# Patient Record
Sex: FEMALE | Race: WHITE
[De-identification: names, ages, dates, MRNs, and addresses within clinical notes are randomized per-mention and may not be internally consistent; named-entity substitution may affect disease eponyms.]

---

## 2019-12-16 ENCOUNTER — HOSPITAL ENCOUNTER (OUTPATIENT)
Dept: HOSPITAL 47 - RADMAMWWP | Age: 49
Discharge: HOME | End: 2019-12-16
Attending: INTERNAL MEDICINE
Payer: COMMERCIAL

## 2019-12-16 DIAGNOSIS — Z12.31: Primary | ICD-10-CM

## 2019-12-16 PROCEDURE — 77063 BREAST TOMOSYNTHESIS BI: CPT

## 2019-12-16 PROCEDURE — 77067 SCR MAMMO BI INCL CAD: CPT

## 2019-12-16 NOTE — MM
Reason for exam: screening  (asymptomatic).

Baseline mammogram.



History:

Patient is postmenopausal and is nulliparous.



Physical Findings:

Nurse did not find any significant physical abnormalities on exam.



MG 3D Screening Mammo W/Cad

Bilateral CC and MLO view(s) were taken.

The breast tissue is heterogeneously dense. This may lower the sensitivity of 

mammography.  No suspicious abnormality.



These results were verbally communicated with the patient and result sheet given 

to the patient on 12/16/19.





ASSESSMENT: Negative, BI-RAD 1



RECOMMENDATION:

Routine screening mammogram of both breasts in 1 year.

## 2020-01-17 ENCOUNTER — HOSPITAL ENCOUNTER (OUTPATIENT)
Dept: HOSPITAL 47 - ORWHC2ENDO | Age: 50
Discharge: HOME | End: 2020-01-17
Attending: SURGERY
Payer: COMMERCIAL

## 2020-01-17 VITALS — TEMPERATURE: 97.3 F

## 2020-01-17 VITALS — SYSTOLIC BLOOD PRESSURE: 132 MMHG | DIASTOLIC BLOOD PRESSURE: 84 MMHG | HEART RATE: 82 BPM

## 2020-01-17 VITALS — RESPIRATION RATE: 16 BRPM

## 2020-01-17 VITALS — BODY MASS INDEX: 25.4 KG/M2

## 2020-01-17 DIAGNOSIS — F41.9: ICD-10-CM

## 2020-01-17 DIAGNOSIS — Z90.49: ICD-10-CM

## 2020-01-17 DIAGNOSIS — I69.311: ICD-10-CM

## 2020-01-17 DIAGNOSIS — F17.210: ICD-10-CM

## 2020-01-17 DIAGNOSIS — Z88.6: ICD-10-CM

## 2020-01-17 DIAGNOSIS — F32.9: ICD-10-CM

## 2020-01-17 DIAGNOSIS — K64.9: ICD-10-CM

## 2020-01-17 DIAGNOSIS — G89.29: ICD-10-CM

## 2020-01-17 DIAGNOSIS — Z88.0: ICD-10-CM

## 2020-01-17 DIAGNOSIS — E78.5: ICD-10-CM

## 2020-01-17 DIAGNOSIS — R60.0: ICD-10-CM

## 2020-01-17 DIAGNOSIS — Z79.899: ICD-10-CM

## 2020-01-17 DIAGNOSIS — I10: ICD-10-CM

## 2020-01-17 DIAGNOSIS — M54.9: ICD-10-CM

## 2020-01-17 DIAGNOSIS — Z88.1: ICD-10-CM

## 2020-01-17 DIAGNOSIS — Z88.8: ICD-10-CM

## 2020-01-17 DIAGNOSIS — M41.9: ICD-10-CM

## 2020-01-17 DIAGNOSIS — Z79.891: ICD-10-CM

## 2020-01-17 DIAGNOSIS — Z12.11: Primary | ICD-10-CM

## 2020-01-17 PROCEDURE — 45378 DIAGNOSTIC COLONOSCOPY: CPT

## 2020-01-17 NOTE — P.GSHP
History of Present Illness


H&P Date: 01/17/20


Chief Complaint: Colon cancer screening





Patient here today for colonoscopy.  She has not had one previously.  No family 

history of colon cancer.  No bowel related complaints.  In the distant past she 

had rectal bleeding while on blood thinners.





Past Medical History


Past Medical History: CVA/TIA, Hyperlipidemia, Hypertension, Memory Impairment


Additional Past Medical History / Comment(s): chronic back pain, leg edema, 

scoliosis, CVA 2018 SOME MEMORY IMPAIRMENT


History of Any Multi-Drug Resistant Organisms: None Reported


Past Surgical History: Cholecystectomy, Orthopedic Surgery


Additional Past Surgical History / Comment(s): LT knee sx,


Past Anesthesia/Blood Transfusion Reactions: No Reported Reaction


Past Psychological History: Anxiety, Depression


Smoking Status: Current every day smoker


Past Alcohol Use History: Rare


Additional Past Alcohol Use History / Comment(s): SMOKES 1 -2 PPD- SINCE AGE 9


Past Drug Use History: Marijuana


Additional Drug Use History / Comment(s): USES MARIJUANA DAILY-INFORMED TO 

REFRAIN FROM USE FOR AT LEAST 24  HOURS PRIOR TO PROCEDURE





- Past Family History


  ** Mother


Family Medical History: No Reported History





Medications and Allergies


                                Home Medications











 Medication  Instructions  Recorded  Confirmed  Type


 


Butalb/Acetaminophen/Caffeine 1 cap PO Q6H PRN 06/20/17 01/15/20 History





[Esgic -40 Capsule]    


 


LORazepam [Ativan] 0.5 mg PO HS PRN 06/20/17 01/15/20 History


 


Naproxen 500 mg PO BID PRN 06/20/17 01/15/20 History


 


risperiDONE [RisperDAL] 2 mg PO HS 06/20/17 01/15/20 History


 


Atorvastatin [Lipitor] 20 mg PO DAILY 01/15/20 01/15/20 History


 


Cyanocobalamin-FA-Pyridoxine 1 tab PO DAILY 01/15/20 01/15/20 History





[Folbic]    


 


HYDROcodone/APAP 7.5-325MG [Norco 1 tab PO BID 01/15/20 01/15/20 History





7.5-325]    


 


Montelukast [Singulair] 10 mg PO DAILY 01/15/20 01/15/20 History


 


Multivit/Folic Acid/Vit K1 1 each PO DAILY 01/15/20 01/15/20 History





[One-A-Day Women's 50 Plus Tab]    


 


Propranolol HCl [Propranolol HCl 60 mg PO DAILY 01/15/20 01/15/20 History





ER]    


 


Sertraline [Zoloft] 100 mg PO DAILY 01/15/20 01/15/20 History


 


Venlafaxine HCl [Effexor XR] 37.5 mg PO DAILY 01/15/20 01/15/20 History


 


risperiDONE 1 mg PO QAM 01/15/20 01/15/20 History








                                    Allergies











Allergy/AdvReac Type Severity Reaction Status Date / Time


 


aspirin Allergy  Vomiting Verified 01/15/20 10:57


 


clarithromycin [From Biaxin] Allergy  Vomiting Verified 01/15/20 10:57


 


omeprazole Allergy  Nausea Verified 01/15/20 10:57


 


Penicillins Allergy  Dyspnea Verified 01/15/20 10:57














Surgical - Exam


                                   Vital Signs











Temp Pulse Resp BP Pulse Ox


 


 97.3 F L  75   18   122/71   97 


 


 01/17/20 09:00  01/17/20 09:00  01/17/20 09:00  01/17/20 09:00  01/17/20 09:00

















Physical exam:


General: Well-developed, well-nourished


HEENT: Normocephalic, sclerae nonicteric


Abdomen: Nontender, nondistended


Extremities: No edema


Neuro: Alert and oriented





Assessment and Plan


(1) Colon cancer screening


Narrative/Plan: 


Will proceed with colonoscopy


Current Visit: Yes   Status: Acute   Code(s): Z12.11 - ENCOUNTER FOR SCREENING 

FOR MALIGNANT NEOPLASM OF COLON   SNOMED Code(s): 618237184

## 2020-01-17 NOTE — P.PCN
Date of Procedure: 01/17/20


Procedure(s) Performed: 


PREOPERATIVE DIAGNOSIS: Colon cancer screening


POSTOPERATIVE DIAGNOSIS: Small hemorrhoids


PROCEDURE: Colonoscopy 


ANESTHESIA: MAC


SURGEON: Ahmet Hubbard M.D.


SPECIMENS: None


ENDOSCOPIC PROCEDURE:  The patient was placed on the endoscopy table in the left

decubitus position.  The Olympus colonoscope was inserted into the anus and 

passed under direct visualization to the base of the cecum.  The appendiceal 

orifice was visualized.  From that point the scope was slowly withdrawn in

specting all surfaces carefully.  There were no neoplastic inflammatory or 

polypoid lesions throughout the cecum, ascending, transverse, descending, 

sigmoid and rectum.  There was no visible diverticulosis noted.  Digital rectal 

examination was normal.  The patient was taken to the recovery room in stable 

condition per anesthesia guidelines.


RECOMMENDATIONS: Increase fiber.  Follow-up colonoscopy 10 years.

## 2021-04-02 ENCOUNTER — HOSPITAL ENCOUNTER (EMERGENCY)
Dept: HOSPITAL 47 - EC | Age: 51
Discharge: HOME | End: 2021-04-02
Payer: COMMERCIAL

## 2021-04-02 VITALS
SYSTOLIC BLOOD PRESSURE: 142 MMHG | HEART RATE: 53 BPM | DIASTOLIC BLOOD PRESSURE: 71 MMHG | TEMPERATURE: 98 F | RESPIRATION RATE: 16 BRPM

## 2021-04-02 DIAGNOSIS — F41.9: ICD-10-CM

## 2021-04-02 DIAGNOSIS — F32.9: ICD-10-CM

## 2021-04-02 DIAGNOSIS — I10: ICD-10-CM

## 2021-04-02 DIAGNOSIS — U07.1: Primary | ICD-10-CM

## 2021-04-02 DIAGNOSIS — Z88.0: ICD-10-CM

## 2021-04-02 DIAGNOSIS — Z79.899: ICD-10-CM

## 2021-04-02 PROCEDURE — 99283 EMERGENCY DEPT VISIT LOW MDM: CPT

## 2021-04-02 NOTE — ED
Recheck HPI





- General


Chief Complaint: Recheck/Abnormal Lab/Rx


Stated Complaint: +COVID,Sent by pcp for BAM


Time Seen by Provider: 04/02/21 14:51


Source: patient


Limitations: no limitations





- History of Present Illness


Initial Comments: 





Patient is a 50-year-old female presenting to the emergency department after 

being sent in by PCP for possible covid antibody treatment.  Patient tested 

positive for Covid today, along with her , and PCP sent in.  She is 

currently asymptomatic, she'll he got tested secondary to her  being 

exposed to at work.  She denies history of asthma or COPD, she does have MS.  

She has no fevers or chills, no chest pain or short of breath, no cough.  She 

has no further complaints.  Upon arrival to the ER her vitals are stable.





- Related Data


                                Home Medications











 Medication  Instructions  Recorded  Confirmed


 


Butalb/Acetaminophen/Caffeine 1 cap PO Q6H PRN 06/20/17 01/15/20





[Esgic -40 Capsule]   


 


LORazepam [Ativan] 0.5 mg PO HS PRN 06/20/17 01/15/20


 


Naproxen 500 mg PO BID PRN 06/20/17 01/15/20


 


risperiDONE [RisperDAL] 2 mg PO HS 06/20/17 01/15/20


 


Atorvastatin [Lipitor] 20 mg PO DAILY 01/15/20 01/15/20


 


Cyanocobalamin-FA-Pyridoxine 1 tab PO DAILY 01/15/20 01/15/20





[Folbic]   


 


HYDROcodone/APAP 7.5-325MG [Norco 1 tab PO BID 01/15/20 01/15/20





7.5-325]   


 


Montelukast [Singulair] 10 mg PO DAILY 01/15/20 01/15/20


 


Multivit/Folic Acid/Vit K1 1 each PO DAILY 01/15/20 01/15/20





[One-A-Day Women's 50 Plus Tab]   


 


Propranolol HCl [Propranolol HCl 60 mg PO DAILY 01/15/20 01/15/20





ER]   


 


Sertraline [Zoloft] 100 mg PO DAILY 01/15/20 01/15/20


 


Venlafaxine HCl [Effexor XR] 37.5 mg PO DAILY 01/15/20 01/15/20


 


risperiDONE 1 mg PO QAM 01/15/20 01/15/20











                                    Allergies











Allergy/AdvReac Type Severity Reaction Status Date / Time


 


aspirin Allergy  Vomiting Verified 04/02/21 14:16


 


clarithromycin [From Biaxin] Allergy  Vomiting Verified 04/02/21 14:16


 


omeprazole Allergy  Nausea Verified 04/02/21 14:16


 


Penicillins Allergy  Dyspnea Verified 04/02/21 14:16














Review of Systems


ROS Statement: 


Those systems with pertinent positive or pertinent negative responses have been 

documented in the HPI.





ROS Other: All systems not noted in ROS Statement are negative.





Past Medical History


Past Medical History: Hypertension


Additional Past Medical History / Comment(s): chronic back pain, leg edema, 

scoliosis, anxiety depression


History of Any Multi-Drug Resistant Organisms: None Reported


Past Surgical History: Orthopedic Surgery


Additional Past Surgical History / Comment(s): knee sx


Past Anesthesia/Blood Transfusion Reactions: No Reported Reaction


Past Psychological History: Anxiety, Depression


Past Alcohol Use History: None Reported


Past Drug Use History: None Reported





- Past Family History


  ** Mother


Family Medical History: No Reported History





General Exam





- General Exam Comments


Initial Comments: 





GENERAL: 


Patient is well-developed and well-nourished.  Patient is nontoxic and in no 

acute distress.





HEAD: 


Atraumatic, normocephalic.





EYES:


Pupils equal round and reactive to light, extraocular movements intact, sclera 

anicteric, conjunctiva are normal.  Eyelids were unremarkable.





ENT: 


TMs normal, nares patent, oropharynx clear without exudates.  Moist mucous 

membranes.





NECK: 


Normal range of motion, supple without lymphadenopathy or JVD.





LUNGS:


Unlabored respirations.  Breath sounds clear to auscultation bilaterally and 

equal.  No wheezes rales or rhonchi.





HEART:


Regular rate and rhythm without murmurs, rubs or gallops.





ABDOMEN: 


Soft, nontender, normoactive bowel sounds.  No guarding, no rebound.  No masses 

appreciated.





: Deferred 





MUSCULOSKELETAL: 


Normal extremities with adequate strength and normal range of motion, no pitting

or edema.  No clubbing or cyanosis.





NEUROLOGICAL: 


Patient is alert and oriented x 3.  Motor and sensory are also intact.  Cranial 

nerves II through XII grossly intact.  Symmetrical smile.  Normal speech, normal

gait.   





PSYCH:


Normal mood, normal affect.





SKIN:


 Warm, Dry, normal turgor, no rashes or lesions noted.


Limitations: no limitations





Course


                                   Vital Signs











  04/02/21





  14:16


 


Temperature 98 F


 


Pulse Rate 53 L


 


Respiratory 16





Rate 


 


Blood Pressure 142/71


 


O2 Sat by Pulse 100





Oximetry 














Medical Decision Making





- Medical Decision Making





Patient is a 50-year-old female history of MS, presenting after testing positive

for Covid today.  She was tested due to her  been exposed at work.  She 

is asymptomatic, her exam is unremarkable.  She does not qualify for Covid 

antiviral treatment.  She can follow-up with her PCP.  She stable for discharge.

 Case discussed with Dr. Brown.





Disposition


Clinical Impression: 


 COVID-19





Disposition: HOME SELF-CARE


Condition: Stable


Instructions (If sedation given, give patient instructions):  Coronavirus Dis

ease 2019 (COVID-19)


Additional Instructions: 


Please return to the Emergency Department if symptoms worsen or any other 

concerns.


Positive Covid test


May take Tylenol or Motrin for body aches, headaches.


Is patient prescribed a controlled substance at d/c from ED?: No


Referrals: 


Migel Santa MD [Primary Care Provider] - 1-2 days

## 2021-05-25 ENCOUNTER — HOSPITAL ENCOUNTER (OUTPATIENT)
Dept: HOSPITAL 47 - LABWHC1 | Age: 51
Discharge: HOME | End: 2021-05-25
Attending: PHYSICIAN ASSISTANT
Payer: COMMERCIAL

## 2021-05-25 DIAGNOSIS — G35: Primary | ICD-10-CM

## 2021-05-25 LAB
BASOPHILS # BLD AUTO: 0.05 X 10*3/UL (ref 0–0.1)
BASOPHILS NFR BLD AUTO: 0.8 %
EOSINOPHIL # BLD AUTO: 0.24 X 10*3/UL (ref 0.04–0.35)
EOSINOPHIL NFR BLD AUTO: 3.6 %
ERYTHROCYTE [DISTWIDTH] IN BLOOD BY AUTOMATED COUNT: 4.22 X 10*6/UL (ref 4.1–5.2)
ERYTHROCYTE [DISTWIDTH] IN BLOOD: 13 % (ref 11.5–14.5)
HCT VFR BLD AUTO: 38.8 % (ref 37.2–46.3)
HGB BLD-MCNC: 12.6 G/DL (ref 12–15)
LYMPHOCYTES # SPEC AUTO: 2.12 X 10*3/UL (ref 0.9–5)
LYMPHOCYTES NFR SPEC AUTO: 32 %
MCH RBC QN AUTO: 29.9 PG (ref 27–32)
MCHC RBC AUTO-ENTMCNC: 32.5 G/DL (ref 32–37)
MCV RBC AUTO: 91.9 FL (ref 80–97)
MONOCYTES # BLD AUTO: 0.41 X 10*3/UL (ref 0.2–1)
MONOCYTES NFR BLD AUTO: 6.2 %
NEUTROPHILS # BLD AUTO: 3.78 X 10*3/UL (ref 1.8–7.7)
NEUTROPHILS NFR BLD AUTO: 57.1 %
PLATELET # BLD AUTO: 175 X 10*3/UL (ref 140–440)
WBC # BLD AUTO: 6.62 X 10*3/UL (ref 4.5–10)

## 2021-05-25 PROCEDURE — 85025 COMPLETE CBC W/AUTO DIFF WBC: CPT

## 2021-05-25 PROCEDURE — 36415 COLL VENOUS BLD VENIPUNCTURE: CPT

## 2021-05-25 PROCEDURE — 82306 VITAMIN D 25 HYDROXY: CPT

## 2021-05-25 PROCEDURE — 80053 COMPREHEN METABOLIC PANEL: CPT

## 2021-05-26 LAB
ALBUMIN SERPL-MCNC: 4.1 G/DL (ref 3.8–4.9)
ALBUMIN/GLOB SERPL: 2.16 G/DL (ref 1.6–3.17)
ALP SERPL-CCNC: 94 U/L (ref 41–126)
ALT SERPL-CCNC: 19 U/L (ref 8–44)
ANION GAP SERPL CALC-SCNC: 10.2 MMOL/L (ref 4–12)
AST SERPL-CCNC: 25 U/L (ref 13–35)
BUN SERPL-SCNC: 7 MG/DL (ref 9–27)
BUN/CREAT SERPL: 8.75 RATIO (ref 12–20)
CALCIUM SPEC-MCNC: 8.8 MG/DL (ref 8.7–10.3)
CHLORIDE SERPL-SCNC: 107 MMOL/L (ref 96–109)
CO2 SERPL-SCNC: 25.8 MMOL/L (ref 21.6–31.8)
GLOBULIN SER CALC-MCNC: 1.9 G/DL (ref 1.6–3.3)
GLUCOSE SERPL-MCNC: 75 MG/DL (ref 70–110)
POTASSIUM SERPL-SCNC: 4.8 MMOL/L (ref 3.5–5.5)
PROT SERPL-MCNC: 6 G/DL (ref 6.2–8.2)
SODIUM SERPL-SCNC: 143 MMOL/L (ref 135–145)

## 2021-10-19 ENCOUNTER — HOSPITAL ENCOUNTER (OUTPATIENT)
Dept: HOSPITAL 47 - RADMAMWWP | Age: 51
Discharge: HOME | End: 2021-10-19
Attending: INTERNAL MEDICINE
Payer: COMMERCIAL

## 2021-10-19 DIAGNOSIS — Z12.31: Primary | ICD-10-CM

## 2021-10-19 PROCEDURE — 77067 SCR MAMMO BI INCL CAD: CPT

## 2021-10-19 PROCEDURE — 77063 BREAST TOMOSYNTHESIS BI: CPT

## 2021-10-20 NOTE — MM
Reason for exam: screening  (asymptomatic).

Last mammogram was performed 1 year and 10 months ago.



History:

Patient is postmenopausal and is nulliparous.



Physical Findings:

A clinical breast exam by your physician is recommended on an annual basis and 

results should be correlated with mammographic findings.



MG 3D Screening Mammo W/Cad

Bilateral CC and MLO view(s) were taken.

Prior study comparison: December 16, 2019, bilateral MG 3d screening mammo w/cad.

The breast tissue is heterogeneously dense. This may lower the sensitivity of 

mammography.  There is no discrete abnormality.  No significant changes when 

compared with prior studies.





ASSESSMENT: Negative, BI-RAD 1



RECOMMENDATION:

Routine screening mammogram of both breasts in 1 year.

## 2023-03-21 ENCOUNTER — HOSPITAL ENCOUNTER (EMERGENCY)
Dept: HOSPITAL 47 - EC | Age: 53
LOS: 1 days | Discharge: HOME | End: 2023-03-22
Payer: COMMERCIAL

## 2023-03-21 VITALS — SYSTOLIC BLOOD PRESSURE: 94 MMHG | DIASTOLIC BLOOD PRESSURE: 73 MMHG | TEMPERATURE: 98.4 F | RESPIRATION RATE: 18 BRPM

## 2023-03-21 DIAGNOSIS — Z88.1: ICD-10-CM

## 2023-03-21 DIAGNOSIS — Z79.899: ICD-10-CM

## 2023-03-21 DIAGNOSIS — F32.A: ICD-10-CM

## 2023-03-21 DIAGNOSIS — I10: ICD-10-CM

## 2023-03-21 DIAGNOSIS — Z88.8: ICD-10-CM

## 2023-03-21 DIAGNOSIS — F41.9: ICD-10-CM

## 2023-03-21 DIAGNOSIS — Z88.0: ICD-10-CM

## 2023-03-21 DIAGNOSIS — Z88.6: ICD-10-CM

## 2023-03-21 DIAGNOSIS — R55: Primary | ICD-10-CM

## 2023-03-21 LAB
ALBUMIN SERPL-MCNC: 4.1 G/DL (ref 3.5–5)
ALP SERPL-CCNC: 79 U/L (ref 38–126)
ALT SERPL-CCNC: 26 U/L (ref 4–34)
ANION GAP SERPL CALC-SCNC: 10 MMOL/L
AST SERPL-CCNC: 26 U/L (ref 14–36)
BASOPHILS # BLD AUTO: 0.1 K/UL (ref 0–0.2)
BASOPHILS NFR BLD AUTO: 1 %
BUN SERPL-SCNC: 16 MG/DL (ref 7–17)
CALCIUM SPEC-MCNC: 8.8 MG/DL (ref 8.4–10.2)
CHLORIDE SERPL-SCNC: 105 MMOL/L (ref 98–107)
CO2 SERPL-SCNC: 22 MMOL/L (ref 22–30)
EOSINOPHIL # BLD AUTO: 0.1 K/UL (ref 0–0.7)
EOSINOPHIL NFR BLD AUTO: 1 %
ERYTHROCYTE [DISTWIDTH] IN BLOOD BY AUTOMATED COUNT: 4.84 M/UL (ref 3.8–5.4)
ERYTHROCYTE [DISTWIDTH] IN BLOOD: 13 % (ref 11.5–15.5)
GLUCOSE SERPL-MCNC: 167 MG/DL (ref 74–99)
HCT VFR BLD AUTO: 43.6 % (ref 34–46)
HGB BLD-MCNC: 14.5 GM/DL (ref 11.4–16)
LYMPHOCYTES # SPEC AUTO: 2.9 K/UL (ref 1–4.8)
LYMPHOCYTES NFR SPEC AUTO: 39 %
MAGNESIUM SPEC-SCNC: 1.7 MG/DL (ref 1.6–2.3)
MCH RBC QN AUTO: 30.1 PG (ref 25–35)
MCHC RBC AUTO-ENTMCNC: 33.3 G/DL (ref 31–37)
MCV RBC AUTO: 90.2 FL (ref 80–100)
MONOCYTES # BLD AUTO: 0.2 K/UL (ref 0–1)
MONOCYTES NFR BLD AUTO: 3 %
NEUTROPHILS # BLD AUTO: 4 K/UL (ref 1.3–7.7)
NEUTROPHILS NFR BLD AUTO: 54 %
PLATELET # BLD AUTO: 236 K/UL (ref 150–450)
POTASSIUM SERPL-SCNC: 3.4 MMOL/L (ref 3.5–5.1)
PROT SERPL-MCNC: 6.8 G/DL (ref 6.3–8.2)
SODIUM SERPL-SCNC: 137 MMOL/L (ref 137–145)
WBC # BLD AUTO: 7.4 K/UL (ref 3.8–10.6)

## 2023-03-21 PROCEDURE — 84484 ASSAY OF TROPONIN QUANT: CPT

## 2023-03-21 PROCEDURE — 80053 COMPREHEN METABOLIC PANEL: CPT

## 2023-03-21 PROCEDURE — 83735 ASSAY OF MAGNESIUM: CPT

## 2023-03-21 PROCEDURE — 36415 COLL VENOUS BLD VENIPUNCTURE: CPT

## 2023-03-21 PROCEDURE — 80306 DRUG TEST PRSMV INSTRMNT: CPT

## 2023-03-21 PROCEDURE — 85610 PROTHROMBIN TIME: CPT

## 2023-03-21 PROCEDURE — 99285 EMERGENCY DEPT VISIT HI MDM: CPT

## 2023-03-21 PROCEDURE — 85025 COMPLETE CBC W/AUTO DIFF WBC: CPT

## 2023-03-21 PROCEDURE — 85730 THROMBOPLASTIN TIME PARTIAL: CPT

## 2023-03-21 PROCEDURE — 93005 ELECTROCARDIOGRAM TRACING: CPT

## 2023-03-21 PROCEDURE — 96360 HYDRATION IV INFUSION INIT: CPT

## 2023-03-21 NOTE — ED
General Adult HPI





- General


Chief complaint: Syncope


Stated complaint: Syncope


Time Seen by Provider: 03/21/23 23:00


Source: patient, EMS, RN notes reviewed


Mode of arrival: EMS


Limitations: no limitations





- History of Present Illness


Initial comments: 


This 72-year-old female presents emergency Department chief complaint syncopal 

episode.  Patient states she was at her house sharp and very hot flushed feeling

and states that she passed out.  States that family members called EMS.  Patient

does admit that she is methamphetamines earlier today.  Patient reportedly 

received Narcan neurologist.  Patient denies opiate abuse.  Denies chest pain 

palpitations shortness breath headache dizziness states she's had a normal 

baseline at this time.








- Related Data


                                Home Medications











 Medication  Instructions  Recorded  Confirmed


 


Butalb/Acetaminophen/Caffeine 1 cap PO Q6H PRN 06/20/17 01/15/20





[Esgic -40 Capsule]   


 


LORazepam [Ativan] 0.5 mg PO HS PRN 06/20/17 01/15/20


 


Naproxen 500 mg PO BID PRN 06/20/17 01/15/20


 


risperiDONE [RisperDAL] 2 mg PO HS 06/20/17 01/15/20


 


Atorvastatin [Lipitor] 20 mg PO DAILY 01/15/20 01/15/20


 


Cyanocobalamin-FA-Pyridoxine 1 tab PO DAILY 01/15/20 01/15/20





[Folbic]   


 


HYDROcodone/APAP 7.5-325MG [Norco 1 tab PO BID 01/15/20 01/15/20





7.5-325]   


 


Montelukast [Singulair] 10 mg PO DAILY 01/15/20 01/15/20


 


Multivit/Folic Acid/Vit K1 1 each PO DAILY 01/15/20 01/15/20





[One-A-Day Women's 50 Plus Tab]   


 


Propranolol HCl [Propranolol HCl 60 mg PO DAILY 01/15/20 01/15/20





ER]   


 


Sertraline [Zoloft] 100 mg PO DAILY 01/15/20 01/15/20


 


Venlafaxine HCl [Effexor XR] 37.5 mg PO DAILY 01/15/20 01/15/20


 


risperiDONE 1 mg PO QAM 01/15/20 01/15/20











                                    Allergies











Allergy/AdvReac Type Severity Reaction Status Date / Time


 


aspirin Allergy  Vomiting Verified 03/21/23 22:45


 


clarithromycin [From Biaxin] Allergy  Vomiting Verified 03/21/23 22:45


 


omeprazole Allergy  Nausea Verified 03/21/23 22:45


 


Penicillins Allergy  Dyspnea Verified 03/21/23 22:45














Review of Systems


ROS Statement: 


Those systems with pertinent positive or pertinent negative responses have been 

documented in the HPI.





ROS Other: All systems not noted in ROS Statement are negative.





Past Medical History


Past Medical History: Hypertension


Additional Past Medical History / Comment(s): chronic back pain, leg edema, 

scoliosis, anxiety depression


History of Any Multi-Drug Resistant Organisms: None Reported


Past Surgical History: Orthopedic Surgery


Additional Past Surgical History / Comment(s): knee sx


Past Anesthesia/Blood Transfusion Reactions: No Reported Reaction


Past Psychological History: Anxiety, Depression


Past Alcohol Use History: None Reported


Past Drug Use History: None Reported





- Past Family History


  ** Mother


Family Medical History: No Reported History





General Exam


Limitations: no limitations


General appearance: alert, in no apparent distress


Head exam: Present: atraumatic, normocephalic, normal inspection


Eye exam: Present: normal appearance, PERRL, EOMI.  Absent: scleral icterus, 

conjunctival injection, periorbital swelling


ENT exam: Present: normal exam, normal oropharynx, mucous membranes moist


Neck exam: Present: normal inspection, full ROM.  Absent: tenderness, 

meningismus, lymphadenopathy


Respiratory exam: Present: normal lung sounds bilaterally.  Absent: respiratory 

distress, wheezes, rales, rhonchi, stridor


Cardiovascular Exam: Present: normal rhythm, tachycardia, normal heart sounds.  

Absent: systolic murmur, diastolic murmur, rubs, gallop, clicks


GI/Abdominal exam: Present: soft, normal bowel sounds.  Absent: distended, 

tenderness, guarding, rebound, rigid


Neurological exam: Present: alert, oriented X3, CN II-XII intact, reflexes 

normal.  Absent: motor sensory deficit





Course


                                   Vital Signs











  03/21/23 03/22/23





  22:43 00:47


 


Temperature 98.4 F 


 


Pulse Rate 113 H 85


 


Respiratory 18 





Rate  


 


Blood Pressure 94/73 


 


O2 Sat by Pulse 100 





Oximetry  














EKG Findings





- EKG Comments:


EKG Findings:: EKG performed at 22:39 sinus tachycardia rate of 107  QRS 

80 QT / /410





- EKG Results:


EKG: interpreted by JESSICA





Medical Decision Making





- Medical Decision Making


Was pt. sent in by a medical professional or institution (JORDAN Dominguez, NP, urgent 

care, hospital, or nursing home...) When possible be specific


@  -No


Did you speak to anyone other than the patient for history (EMS, parent, family,

police, friend...)? What history was obtained from this source 


@  -No


Did you review nursing and triage notes (agree or disagree)?  Why? 


@  -I reviewed and agree with nursing and triage notes


Were old charts reviewed (outside hosp., previous admission, EMS record, old 

EKG, old radiological studies, urgent care reports/EKG's, nursing home records)?

Report findings 


@  -No old charts were reviewed


Differential Diagnosis (chest pain, altered mental status, abdominal pain women,

abdominal pain men, vaginal bleeding, weakness, fever, dyspnea, syncope, 

headache, dizziness, GI bleed, back pain, seizure, CVA, palpatations, mental 

health, musculoskeletal)? 


@  -Differential Syncope:


Valvular disease, hypertrophic cardiomyopathy, pulmonary embolism, tamponade, 

tachycardia, bradycardia, MI, hypovolemia, hemorrhage, dissection, anemia, 

intracranial hemorrhage, seizure, hypoglycemia, carbon monoxide poisoning, this 

is not meant to be an all-inclusive list.


EKG interpreted by me (3pts min.).


@  -As above


X-rays interpreted by me (1pt min.).


@  -Patient refused


CT interpreted by me (1pt min.).


@  -None done


U/S interpreted by me (1pt. min.).


@  -None done


What testing was considered but not performed or refused? (CT, X-rays, U/S, 

labs)? Why?


@  -None


What meds were considered but not given or refused? Why?


@  -None


Did you discuss the management of the patient with other professionals 

(professionals i.e. JORDAN Dominguez, NP, lab, RT, psych nurse, , , 

teacher, , )? Give summary


@  -No


Was smoking cessation discussed for >3mins.?


@  -No


Was critical care preformed (if so, how long)?


@  -No


Were there social determinants of health that impacted care today? How? 

(Homelessness, low income, unemployed, alcoholism, drug addiction, tr

ansportation, low edu. Level, literacy, decrease access to med. care, [facility], 

rehab)?


@  -No


Was there de-escalation of care discussed even if they declined (Discuss DNR or 

withdrawal of care, Hospice)? DNR status


@  -No


What co-morbidities impacted this encounter? (DM, HTN, Smoking, COPD, CAD, 

Cancer, CVA, ARF, Chemo, Hep., AIDS, mental health diagnosis, sleep apnea, 

morbid obesity)?


@  -Drug abuse


Was patient admitted / discharged? Hospital course, mention meds given and 

route, prescriptions, significant lab abnormalities, going to OR and other 

pertinent info.


@  -Discharge patient reportedly had a syncopal episode versus possible drug 

overdose.  Patient is stable has no current complaints.  Patient was discharged 

in stable condition return parameters were discussed. 


Undiagnosed new problem with uncertain prognosis?


@  -No


Drug Therapy requiring intensive monitoring for toxicity (Heparin, Nitro, Insul

in, Cardizem)?


@  -No


Were any procedures done?


@  -No


Diagnosis/symptom?


@  -Syncope


Acute, or Chronic, or Acute on Chronic?


@  -Acute


Uncomplicated (without systemic symptoms) or Complicated (systemic symptoms)?


@  -Uncomplicated


Side effects of treatment?


@  -No


Exacerbation, Progression, or Severe Exacerbation?


@  -No


Poses a threat to life or bodily function? How? (Chest pain, USA, MI, pneumonia,

PE, COPD, DKA, ARF, appy, cholecystitis, CVA, Diverticulitis, Homicidal, 

Suicidal, threat to staff... and all critical care pts)


@  -No








- Lab Data


Result diagrams: 


                                 03/21/23 22:44





                                 03/21/23 22:44


                                   Lab Results











  03/21/23 03/21/23 03/21/23 Range/Units





  22:44 22:44 23:33 


 


WBC  7.4    (3.8-10.6)  k/uL


 


RBC  4.84    (3.80-5.40)  m/uL


 


Hgb  14.5    (11.4-16.0)  gm/dL


 


Hct  43.6    (34.0-46.0)  %


 


MCV  90.2    (80.0-100.0)  fL


 


MCH  30.1    (25.0-35.0)  pg


 


MCHC  33.3    (31.0-37.0)  g/dL


 


RDW  13.0    (11.5-15.5)  %


 


Plt Count  236    (150-450)  k/uL


 


MPV  9.5    


 


Neutrophils %  54    %


 


Lymphocytes %  39    %


 


Monocytes %  3    %


 


Eosinophils %  1    %


 


Basophils %  1    %


 


Neutrophils #  4.0    (1.3-7.7)  k/uL


 


Lymphocytes #  2.9    (1.0-4.8)  k/uL


 


Monocytes #  0.2    (0-1.0)  k/uL


 


Eosinophils #  0.1    (0-0.7)  k/uL


 


Basophils #  0.1    (0-0.2)  k/uL


 


PT    10.9  (9.0-12.0)  sec


 


INR    1.0  (<1.2)  


 


APTT    18.9 L  (22.0-30.0)  sec


 


Sodium   137   (137-145)  mmol/L


 


Potassium   3.4 L   (3.5-5.1)  mmol/L


 


Chloride   105   ()  mmol/L


 


Carbon Dioxide   22   (22-30)  mmol/L


 


Anion Gap   10   mmol/L


 


BUN   16   (7-17)  mg/dL


 


Creatinine   0.78   (0.52-1.04)  mg/dL


 


Est GFR (CKD-EPI)AfAm   >90   (>60 ml/min/1.73 sqM)  


 


Est GFR (CKD-EPI)NonAf   87   (>60 ml/min/1.73 sqM)  


 


Glucose   167 H   (74-99)  mg/dL


 


Calcium   8.8   (8.4-10.2)  mg/dL


 


Magnesium   1.7   (1.6-2.3)  mg/dL


 


Total Bilirubin   0.5   (0.2-1.3)  mg/dL


 


AST   26   (14-36)  U/L


 


ALT   26   (4-34)  U/L


 


Alkaline Phosphatase   79   ()  U/L


 


Troponin I     (0.000-0.034)  ng/mL


 


Total Protein   6.8   (6.3-8.2)  g/dL


 


Albumin   4.1   (3.5-5.0)  g/dL


 


Urine Opiates Screen     (NotDetected)  


 


Ur Oxycodone Screen     (NotDetected)  


 


Urine Methadone Screen     (NotDetected)  


 


Ur Propoxyphene Screen     (NotDetected)  


 


Ur Barbiturates Screen     (NotDetected)  


 


U Tricyclic Antidepress     (NotDetected)  


 


Ur Phencyclidine Scrn     (NotDetected)  


 


Ur Amphetamines Screen     (NotDetected)  


 


U Methamphetamines Scrn     (NotDetected)  


 


U Benzodiazepines Scrn     (NotDetected)  


 


Urine Cocaine Screen     (NotDetected)  


 


U Marijuana (THC) Screen     (NotDetected)  














  03/21/23 03/22/23 Range/Units





  23:33 00:49 


 


WBC    (3.8-10.6)  k/uL


 


RBC    (3.80-5.40)  m/uL


 


Hgb    (11.4-16.0)  gm/dL


 


Hct    (34.0-46.0)  %


 


MCV    (80.0-100.0)  fL


 


MCH    (25.0-35.0)  pg


 


MCHC    (31.0-37.0)  g/dL


 


RDW    (11.5-15.5)  %


 


Plt Count    (150-450)  k/uL


 


MPV    


 


Neutrophils %    %


 


Lymphocytes %    %


 


Monocytes %    %


 


Eosinophils %    %


 


Basophils %    %


 


Neutrophils #    (1.3-7.7)  k/uL


 


Lymphocytes #    (1.0-4.8)  k/uL


 


Monocytes #    (0-1.0)  k/uL


 


Eosinophils #    (0-0.7)  k/uL


 


Basophils #    (0-0.2)  k/uL


 


PT    (9.0-12.0)  sec


 


INR    (<1.2)  


 


APTT    (22.0-30.0)  sec


 


Sodium    (137-145)  mmol/L


 


Potassium    (3.5-5.1)  mmol/L


 


Chloride    ()  mmol/L


 


Carbon Dioxide    (22-30)  mmol/L


 


Anion Gap    mmol/L


 


BUN    (7-17)  mg/dL


 


Creatinine    (0.52-1.04)  mg/dL


 


Est GFR (CKD-EPI)AfAm    (>60 ml/min/1.73 sqM)  


 


Est GFR (CKD-EPI)NonAf    (>60 ml/min/1.73 sqM)  


 


Glucose    (74-99)  mg/dL


 


Calcium    (8.4-10.2)  mg/dL


 


Magnesium    (1.6-2.3)  mg/dL


 


Total Bilirubin    (0.2-1.3)  mg/dL


 


AST    (14-36)  U/L


 


ALT    (4-34)  U/L


 


Alkaline Phosphatase    ()  U/L


 


Troponin I  <0.012   (0.000-0.034)  ng/mL


 


Total Protein    (6.3-8.2)  g/dL


 


Albumin    (3.5-5.0)  g/dL


 


Urine Opiates Screen   Not Detected  (NotDetected)  


 


Ur Oxycodone Screen   Not Detected  (NotDetected)  


 


Urine Methadone Screen   Not Detected  (NotDetected)  


 


Ur Propoxyphene Screen   Not Detected  (NotDetected)  


 


Ur Barbiturates Screen   Not Detected  (NotDetected)  


 


U Tricyclic Antidepress   Not Detected  (NotDetected)  


 


Ur Phencyclidine Scrn   Not Detected  (NotDetected)  


 


Ur Amphetamines Screen   Detected H  (NotDetected)  


 


U Methamphetamines Scrn   Detected H  (NotDetected)  


 


U Benzodiazepines Scrn   Not Detected  (NotDetected)  


 


Urine Cocaine Screen   Not Detected  (NotDetected)  


 


U Marijuana (THC) Screen   Detected H  (NotDetected)  














Disposition


Clinical Impression: 


 Syncope





Disposition: HOME SELF-CARE


Condition: Stable


Instructions (If sedation given, give patient instructions):  Syncope (ED)


Additional Instructions: 


Please return to the Emergency Department if symptoms worsen or any other 

concerns.


Is patient prescribed a controlled substance at d/c from ED?: No


Referrals: 


Dilma Wong MD [Primary Care Provider] - 1-2 days


Time of Disposition: 01:33

## 2023-03-22 VITALS — HEART RATE: 85 BPM

## 2023-03-22 LAB
APTT BLD: 18.9 SEC (ref 22–30)
INR PPP: 1 (ref ?–1.2)
PT BLD: 10.9 SEC (ref 9–12)